# Patient Record
Sex: FEMALE | Race: WHITE | NOT HISPANIC OR LATINO
[De-identification: names, ages, dates, MRNs, and addresses within clinical notes are randomized per-mention and may not be internally consistent; named-entity substitution may affect disease eponyms.]

---

## 2017-11-10 PROBLEM — Z00.00 ENCOUNTER FOR PREVENTIVE HEALTH EXAMINATION: Status: ACTIVE | Noted: 2017-11-10

## 2017-11-30 ENCOUNTER — APPOINTMENT (OUTPATIENT)
Dept: OBGYN | Facility: CLINIC | Age: 32
End: 2017-11-30
Payer: COMMERCIAL

## 2017-11-30 VITALS
WEIGHT: 137 LBS | SYSTOLIC BLOOD PRESSURE: 120 MMHG | BODY MASS INDEX: 19.61 KG/M2 | DIASTOLIC BLOOD PRESSURE: 80 MMHG | HEIGHT: 70 IN

## 2017-11-30 DIAGNOSIS — Z78.9 OTHER SPECIFIED HEALTH STATUS: ICD-10-CM

## 2017-11-30 DIAGNOSIS — Z87.891 PERSONAL HISTORY OF NICOTINE DEPENDENCE: ICD-10-CM

## 2017-11-30 DIAGNOSIS — Z83.3 FAMILY HISTORY OF DIABETES MELLITUS: ICD-10-CM

## 2017-11-30 PROCEDURE — 99204 OFFICE O/P NEW MOD 45 MIN: CPT

## 2017-11-30 RX ORDER — DOXYCYCLINE HYCLATE 100 MG/1
100 TABLET ORAL
Qty: 3 | Refills: 0 | Status: ACTIVE | COMMUNITY
Start: 2017-11-30 | End: 1900-01-01

## 2021-10-21 ENCOUNTER — APPOINTMENT (OUTPATIENT)
Dept: OBGYN | Facility: CLINIC | Age: 36
End: 2021-10-21
Payer: COMMERCIAL

## 2021-10-21 ENCOUNTER — NON-APPOINTMENT (OUTPATIENT)
Age: 36
End: 2021-10-21

## 2021-10-21 VITALS
DIASTOLIC BLOOD PRESSURE: 60 MMHG | WEIGHT: 145 LBS | SYSTOLIC BLOOD PRESSURE: 100 MMHG | HEIGHT: 70 IN | OXYGEN SATURATION: 98 % | BODY MASS INDEX: 20.76 KG/M2

## 2021-10-21 DIAGNOSIS — O09.819 SUPERVISION OF PREGNANCY RESULTING FROM ASSISTED REPRODUCTIVE TECHNOLOGY, UNSPECIFIED TRIMESTER: ICD-10-CM

## 2021-10-21 DIAGNOSIS — G43.909 MIGRAINE, UNSPECIFIED, NOT INTRACTABLE, W/OUT STATUS MIGRAINOSUS: ICD-10-CM

## 2021-10-21 PROCEDURE — 0500F INITIAL PRENATAL CARE VISIT: CPT

## 2021-10-21 PROCEDURE — 81002 URINALYSIS NONAUTO W/O SCOPE: CPT

## 2021-10-21 RX ORDER — ASPIRIN 81 MG/1
81 TABLET, COATED ORAL
Refills: 0 | Status: ACTIVE | COMMUNITY

## 2021-10-21 RX ORDER — ENOXAPARIN SODIUM 40 MG/.4ML
40 INJECTION SUBCUTANEOUS
Refills: 0 | Status: ACTIVE | COMMUNITY

## 2021-10-25 ENCOUNTER — LABORATORY RESULT (OUTPATIENT)
Age: 36
End: 2021-10-25

## 2021-10-26 ENCOUNTER — APPOINTMENT (OUTPATIENT)
Dept: MATERNAL FETAL MEDICINE | Facility: CLINIC | Age: 36
End: 2021-10-26
Payer: COMMERCIAL

## 2021-10-26 ENCOUNTER — NON-APPOINTMENT (OUTPATIENT)
Age: 36
End: 2021-10-26

## 2021-10-26 VITALS
HEIGHT: 70 IN | BODY MASS INDEX: 20.9 KG/M2 | DIASTOLIC BLOOD PRESSURE: 70 MMHG | SYSTOLIC BLOOD PRESSURE: 108 MMHG | WEIGHT: 146 LBS

## 2021-10-26 PROCEDURE — 81003 URINALYSIS AUTO W/O SCOPE: CPT | Mod: QW

## 2021-10-26 PROCEDURE — 99205 OFFICE O/P NEW HI 60 MIN: CPT

## 2021-10-26 NOTE — DISCUSSION/SUMMARY
[FreeTextEntry1] : 1. Recurrent pregnancy loss\par RPL is classically defined as the occurrence of three or more consecutive losses of clinically recognized pregnancies prior to the 20th week of gestation. Clinical pregnancies are confirmed by ultrasound or histopathologic evaluation; chemical pregnancies are not included. There is a general consensus that healthy women should not undergo extensive evaluation after a single first trimester or early second trimester spontaneous miscarriage (up to 20 weeks), given these are relatively common, sporadic events. \par \par In prospective studies, the overall risk of miscarriage in the next pregnancy remains about 15% after one miscarriage, but rises to 17 to 31% after two consecutive miscarriages and to 25 to 46% after three or more miscarriages. Based on these and similar data, most experts initiate evaluation and treatment of RPL after two consecutive miscarriages.  Most women with RPL have a good prognosis for eventually having a successful pregnancy, even when a definitive diagnosis is not made and no treatment initiated (reported rates of subsequent successful pregnancy outcome have ranged from 47 to 77%).  Although RPL is an important problem in women's health, there are many unsolved questions regarding etiology, evaluation, and management. Unfortunately, the cause of RPL can be determined in only 50% of patients. General etiological categories of RPL include anatomic, immunological, genetic, endocrine, infectious, thrombophilic, and environmental factors.\par \par Previous testing for inherited thrombophilias and antiphospholipid syndrome all negative. This pregnancy is IVF with preimplantation genetic testing (no need for parental karyotype to rule out a balanced translocation). Patient also reports many evaluations of the uterine cavity were normal prepregnancy. We reviewed the controversial nature of anticoagulation use in this setting and discussed the lack of a clear indication for its use. Regardless, in this well counseled patient who prefers to continue a prophylactic dose of Lovenox in pregnancy I am comfortable. Risks reviewed. \par \par 2. Advanced maternal age\par Pregnancy complications that occur with increased frequency in older gravidae include: ectopic pregnancy, spontaneous , fetal chromosomal abnormalities, some congenital anomalies, placenta previa, gestational diabetes, preeclampsia, and  delivery. Such complications may, in turn, result in  birth. There is also an increased risk of  mortality. We discussed strategies to monitor/screen for these adverse outcomes and the possible need for increased maternal and fetal surveillance during pregnancy. \par \par Discussed increased risk of aneuploidy with advancing maternal age. Discussed screening options for detection of aneuploidy (including cffDNA), and limitations of screening tests. Also discussed diagnostic tests available including CVS or amniocentesis. Risks, benefits and alternatives of tests discussed. \par \par \par Recommendations:\par 1. Will co-manage prenatal care with midwifery practice\par 2. Obtain prenatal records from this pregnancy and MFM consult for my review, will hold off on sending additional testing at this time\par 3. Early anatomy sonogram ~16 weeks and routine anatomy 20-21 weeks\par 4. Due to poor obstetric history recommended serial evaluation of fetal growth ~q4 weeks after anatomy sonogram. Will likely also initiate increased fetal testing ~32 to 36 weeks. \par 5. Discussed based on history will likely recommend delivery 39-40 weeks\par

## 2021-10-26 NOTE — HISTORY OF PRESENT ILLNESS
[FreeTextEntry1] : Maternal-Fetal Medicine consultation \par Prenatal care: Heidy Valdez CNM\par \par Chief compliant: Pregnant with history of recurrent pregnancy loss\par \par REINIER MOSLEY is a 36 year  at 15w5d (JP 22 by IVF per patient report) that presents for a MFM consultation. She has been receiving prenatal care at an outside OB group. Reports prenatal care has been uncomplicated. Also, reports seeing MFM at [facility] and no new recs made. \par \par Obstetric history:\par ETOP x 2 (medical x 1, surgical x 1)\par Early loss x 5, all < 10 weeks, 2 miscarriages genetically tested and normal per patient \par (3 total D&Cs); IVF with different providers including [facility], most recently Dr. Flores at [facility]\par \par Medical history notable for migraines since childhood, ~1x/month. Followed by neurologist, Dr. Poppy Ferrell. Magnesium daily, and in cases migraine using prednisone 20mg x 1 and Rizatriptan. Gynecologic history is unremarkable. Current medications include prenatal vitamins, Lovenox 40 mg daily, aspirin 81 mg daily. Allergies: NKDA \par \par She works as a . She lives with her . She denies a history of tobacco use. She denies alcohol or drug use in this pregnancy.  She has never received blood products but is willing to receive them if needed. \par \par Family history is notable for DM in her father. Mother has arthritis, psoriasis, rheumatologic d/o. Brother is healthy. She denies a family history of birth defects, mental retardation, developmental delay or genetic disorders. Denies history of poor obstetric history.\par \par Height: 5'10" Prepregnancy weight: 144# \par Prepregnancy BMI: 20.7

## 2021-10-26 NOTE — DATA REVIEWED
[FreeTextEntry1] : MFM consult 2015 reviewed, scanned in this chart\par \par Bedside sonogram: Fetal biometry correlates with dates. Normal FHR. Low-lying placenta on abdominal views.

## 2021-10-27 ENCOUNTER — NON-APPOINTMENT (OUTPATIENT)
Age: 36
End: 2021-10-27

## 2021-10-28 ENCOUNTER — NON-APPOINTMENT (OUTPATIENT)
Age: 36
End: 2021-10-28

## 2021-10-28 ENCOUNTER — APPOINTMENT (OUTPATIENT)
Dept: OBGYN | Facility: CLINIC | Age: 36
End: 2021-10-28
Payer: COMMERCIAL

## 2021-10-28 VITALS
DIASTOLIC BLOOD PRESSURE: 70 MMHG | WEIGHT: 147 LBS | OXYGEN SATURATION: 99 % | BODY MASS INDEX: 21.09 KG/M2 | SYSTOLIC BLOOD PRESSURE: 100 MMHG

## 2021-10-28 DIAGNOSIS — Z67.11 TYPE A BLOOD, RH NEGATIVE: ICD-10-CM

## 2021-10-28 PROCEDURE — 81002 URINALYSIS NONAUTO W/O SCOPE: CPT

## 2021-10-28 PROCEDURE — 0502F SUBSEQUENT PRENATAL CARE: CPT

## 2021-11-03 ENCOUNTER — TRANSCRIPTION ENCOUNTER (OUTPATIENT)
Age: 36
End: 2021-11-03

## 2021-11-03 ENCOUNTER — APPOINTMENT (OUTPATIENT)
Dept: ANTEPARTUM | Facility: CLINIC | Age: 36
End: 2021-11-03
Payer: COMMERCIAL

## 2021-11-03 ENCOUNTER — ASOB RESULT (OUTPATIENT)
Age: 36
End: 2021-11-03

## 2021-11-03 PROCEDURE — 76817 TRANSVAGINAL US OBSTETRIC: CPT

## 2021-11-03 PROCEDURE — 76811 OB US DETAILED SNGL FETUS: CPT

## 2021-11-04 ENCOUNTER — NON-APPOINTMENT (OUTPATIENT)
Age: 36
End: 2021-11-04

## 2021-11-11 ENCOUNTER — LABORATORY RESULT (OUTPATIENT)
Age: 36
End: 2021-11-11

## 2021-11-11 ENCOUNTER — APPOINTMENT (OUTPATIENT)
Dept: OBGYN | Facility: CLINIC | Age: 36
End: 2021-11-11
Payer: COMMERCIAL

## 2021-11-11 ENCOUNTER — NON-APPOINTMENT (OUTPATIENT)
Age: 36
End: 2021-11-11

## 2021-11-11 VITALS
DIASTOLIC BLOOD PRESSURE: 70 MMHG | SYSTOLIC BLOOD PRESSURE: 120 MMHG | BODY MASS INDEX: 21.09 KG/M2 | WEIGHT: 147 LBS | OXYGEN SATURATION: 98 %

## 2021-11-11 PROCEDURE — 0502F SUBSEQUENT PRENATAL CARE: CPT

## 2021-11-11 PROCEDURE — 36415 COLL VENOUS BLD VENIPUNCTURE: CPT

## 2021-11-13 LAB
25(OH)D3 SERPL-MCNC: 40.1 NG/ML
ALBUMIN SERPL ELPH-MCNC: 4.4 G/DL
ALP BLD-CCNC: 146 U/L
ALT SERPL-CCNC: 23 U/L
ANION GAP SERPL CALC-SCNC: 21 MMOL/L
AST SERPL-CCNC: 20 U/L
BILIRUB SERPL-MCNC: <0.2 MG/DL
BUN SERPL-MCNC: 17 MG/DL
CALCIUM SERPL-MCNC: 10.2 MG/DL
CHLORIDE SERPL-SCNC: 102 MMOL/L
CO2 SERPL-SCNC: 18 MMOL/L
CREAT SERPL-MCNC: 0.55 MG/DL
GLUCOSE SERPL-MCNC: 69 MG/DL
POTASSIUM SERPL-SCNC: 4.8 MMOL/L
PROT SERPL-MCNC: 6.7 G/DL
SODIUM SERPL-SCNC: 141 MMOL/L
TSH SERPL-ACNC: 1.18 UIU/ML

## 2021-11-15 ENCOUNTER — TRANSCRIPTION ENCOUNTER (OUTPATIENT)
Age: 36
End: 2021-11-15

## 2021-11-17 ENCOUNTER — ASOB RESULT (OUTPATIENT)
Age: 36
End: 2021-11-17

## 2021-11-17 ENCOUNTER — APPOINTMENT (OUTPATIENT)
Dept: ANTEPARTUM | Facility: CLINIC | Age: 36
End: 2021-11-17
Payer: COMMERCIAL

## 2021-11-17 PROCEDURE — 76816 OB US FOLLOW-UP PER FETUS: CPT

## 2021-11-24 ENCOUNTER — NON-APPOINTMENT (OUTPATIENT)
Age: 36
End: 2021-11-24

## 2021-11-27 ENCOUNTER — TRANSCRIPTION ENCOUNTER (OUTPATIENT)
Age: 36
End: 2021-11-27

## 2021-11-29 ENCOUNTER — NON-APPOINTMENT (OUTPATIENT)
Age: 36
End: 2021-11-29

## 2021-12-02 ENCOUNTER — APPOINTMENT (OUTPATIENT)
Dept: OBGYN | Facility: CLINIC | Age: 36
End: 2021-12-02
Payer: COMMERCIAL

## 2021-12-02 VITALS — BODY MASS INDEX: 21.67 KG/M2 | DIASTOLIC BLOOD PRESSURE: 80 MMHG | SYSTOLIC BLOOD PRESSURE: 110 MMHG | WEIGHT: 151 LBS

## 2021-12-02 PROCEDURE — 0502F SUBSEQUENT PRENATAL CARE: CPT

## 2021-12-02 PROCEDURE — 81002 URINALYSIS NONAUTO W/O SCOPE: CPT

## 2021-12-08 ENCOUNTER — APPOINTMENT (OUTPATIENT)
Dept: ANTEPARTUM | Facility: CLINIC | Age: 36
End: 2021-12-08
Payer: COMMERCIAL

## 2021-12-08 ENCOUNTER — ASOB RESULT (OUTPATIENT)
Age: 36
End: 2021-12-08

## 2021-12-08 PROCEDURE — 76816 OB US FOLLOW-UP PER FETUS: CPT

## 2021-12-09 ENCOUNTER — NON-APPOINTMENT (OUTPATIENT)
Age: 36
End: 2021-12-09

## 2021-12-09 ENCOUNTER — TRANSCRIPTION ENCOUNTER (OUTPATIENT)
Age: 36
End: 2021-12-09

## 2021-12-14 ENCOUNTER — TRANSCRIPTION ENCOUNTER (OUTPATIENT)
Age: 36
End: 2021-12-14

## 2021-12-14 ENCOUNTER — APPOINTMENT (OUTPATIENT)
Dept: MATERNAL FETAL MEDICINE | Facility: CLINIC | Age: 36
End: 2021-12-14
Payer: COMMERCIAL

## 2021-12-14 VITALS
SYSTOLIC BLOOD PRESSURE: 113 MMHG | BODY MASS INDEX: 21.65 KG/M2 | WEIGHT: 151.25 LBS | HEIGHT: 70 IN | DIASTOLIC BLOOD PRESSURE: 60 MMHG

## 2021-12-14 PROCEDURE — 99215 OFFICE O/P EST HI 40 MIN: CPT

## 2021-12-15 ENCOUNTER — APPOINTMENT (OUTPATIENT)
Dept: ANTEPARTUM | Facility: CLINIC | Age: 36
End: 2021-12-15
Payer: COMMERCIAL

## 2021-12-15 ENCOUNTER — ASOB RESULT (OUTPATIENT)
Age: 36
End: 2021-12-15

## 2021-12-15 PROCEDURE — 76946 ECHO GUIDE FOR AMNIOCENTESIS: CPT

## 2021-12-15 PROCEDURE — 59000 AMNIOCENTESIS DIAGNOSTIC: CPT

## 2021-12-15 PROCEDURE — 90384 RH IG FULL-DOSE IM: CPT

## 2021-12-16 ENCOUNTER — APPOINTMENT (OUTPATIENT)
Dept: OBGYN | Facility: CLINIC | Age: 36
End: 2021-12-16

## 2021-12-16 ENCOUNTER — NON-APPOINTMENT (OUTPATIENT)
Age: 36
End: 2021-12-16

## 2021-12-17 ENCOUNTER — NON-APPOINTMENT (OUTPATIENT)
Age: 36
End: 2021-12-17

## 2021-12-17 ENCOUNTER — TRANSCRIPTION ENCOUNTER (OUTPATIENT)
Age: 36
End: 2021-12-17

## 2021-12-20 ENCOUNTER — TRANSCRIPTION ENCOUNTER (OUTPATIENT)
Age: 36
End: 2021-12-20

## 2021-12-21 ENCOUNTER — TRANSCRIPTION ENCOUNTER (OUTPATIENT)
Age: 36
End: 2021-12-21

## 2021-12-22 ENCOUNTER — TRANSCRIPTION ENCOUNTER (OUTPATIENT)
Age: 36
End: 2021-12-22

## 2021-12-22 ENCOUNTER — APPOINTMENT (OUTPATIENT)
Dept: ANTEPARTUM | Facility: CLINIC | Age: 36
End: 2021-12-22
Payer: COMMERCIAL

## 2021-12-22 ENCOUNTER — ASOB RESULT (OUTPATIENT)
Age: 36
End: 2021-12-22

## 2021-12-22 VITALS — DIASTOLIC BLOOD PRESSURE: 60 MMHG | SYSTOLIC BLOOD PRESSURE: 113 MMHG

## 2021-12-22 PROCEDURE — 76816 OB US FOLLOW-UP PER FETUS: CPT

## 2021-12-22 RX ORDER — VITAMIN A, ASCORBIC ACID, VITAMIN D, .ALPHA.-TOCOPHEROL, THIAMINE MONONITRATE, RIBOFLAVIN, NIACIN, PYRIDOXINE HYDROCHLORIDE, FOLIC ACID, CYANOCOBALAMIN, CALCIUM, IRON, MAGNESIUM, ZINC, AND COPPER 2700; 70; 400; 30; 1.6; 1.8; 18; 2.5; 1; 12; 100; 65; 25; 25; 2 [IU]/1; MG/1; [IU]/1; [IU]/1; MG/1; MG/1; MG/1; MG/1; MG/1; UG/1; MG/1; MG/1; MG/1; MG/1; MG/1
TABLET ORAL DAILY
Qty: 90 | Refills: 1 | Status: ACTIVE | COMMUNITY
Start: 2021-12-22 | End: 1900-01-01

## 2021-12-22 NOTE — DISCUSSION/SUMMARY
[FreeTextEntry1] : Early onset fetal growth restriction\par \par Patient and her partner were both present for today's discussion. The below was reviewed in detail. \par \par Risks and benefits of amniocentesis reviewed. Limitations of IVF with PGS reviewed. We also discussed the increased risk of underlying fetal genetic anomalies in the setting of early onset FGR. We discussed the significantly increased risk of stillbirth, adverse  outcomes,  death and indicated  delivery with these findings. We also discussed the increased risk of other adverse outcomes such as cerebal palsy, even with optimal care, and reviewed other risks related to extreme prematurity.\par \par Option of pregnancy termination reviewed. Limitations in [place] state reviewed. Patient and partner declined this option for now and opted to proceed with amniocentesis with repeat evaluation of fetal growth at 23w6d.  \par \par Signs and symptoms of preeclampsia reviewed\par \par Continue to follow with me ~2 weeks\par

## 2021-12-22 NOTE — HISTORY OF PRESENT ILLNESS
[FreeTextEntry1] : Maternal-Fetal Medicine consultation \par Prenatal care: Heidy Valdez CNM\par \par Chief compliant: Early onset fetal growth restriction\par \par REINIER MOSLEY is a 36 year  at 22w5d (JP 22 by date of conception/IVF) that presents for a follow-up MFM consultation due to the poor obstetric history and concern for 2nd trimester/early onset fetal growth restriction. \par \par AP issues:\par 1. IVF pregnancy\par 2. Advanced maternal age\par 3. Poor obstetric history: Early loss x 5, all < 10 weeks, 2 miscarriages genetically tested and normal per patient \par (3 total D&Cs). Inherited and acquired thrombophilia panels negative/normal. Regardless, patient has opted to continue LMWH in the pregnancy -- risks and benefits discussed. \par 4. Migraines since childhood, ~1x/month. Followed by neurologist, Dr. Poppy Ferrell. Magnesium daily, and in cases migraine using prednisone 20mg x 1 and Rizatriptan. \par 5. Suspected early onset fetal growth restriction:\par 11/3/21 16w6d, fetal growth lagging by 6 days\par 21 18w6d, growth lagging by 9 days\par 21 21w6d, growth lagging by 13 days, otherwise normal anatomy sonogram. Placenta previa noted. \par \par Current medications include prenatal vitamins, Lovenox 40 mg daily, aspirin 81 mg daily. Allergies: NKDA \par \par Height: 5'10" Prepregnancy weight: 144# \par Prepregnancy BMI: 20.7

## 2021-12-24 ENCOUNTER — TRANSCRIPTION ENCOUNTER (OUTPATIENT)
Age: 36
End: 2021-12-24

## 2021-12-28 ENCOUNTER — TRANSCRIPTION ENCOUNTER (OUTPATIENT)
Age: 36
End: 2021-12-28

## 2021-12-28 DIAGNOSIS — O36.5990 MATERNAL CARE FOR OTHER KNOWN OR SUSPECTED POOR FETAL GROWTH, UNSPECIFIED TRIMESTER, NOT APPLICABLE OR UNSPECIFIED: ICD-10-CM

## 2022-01-03 ENCOUNTER — APPOINTMENT (OUTPATIENT)
Dept: PEDIATRIC CARDIOLOGY | Facility: CLINIC | Age: 37
End: 2022-01-03

## 2022-01-03 PROCEDURE — 76820 UMBILICAL ARTERY ECHO: CPT

## 2022-01-03 PROCEDURE — 99203 OFFICE O/P NEW LOW 30 MIN: CPT

## 2022-01-03 PROCEDURE — 76821 MIDDLE CEREBRAL ARTERY ECHO: CPT

## 2022-01-03 PROCEDURE — 93325 DOPPLER ECHO COLOR FLOW MAPG: CPT | Mod: 1L,59

## 2022-01-03 PROCEDURE — 76827 ECHO EXAM OF FETAL HEART: CPT

## 2022-01-03 PROCEDURE — 76825 ECHO EXAM OF FETAL HEART: CPT

## 2022-01-12 ENCOUNTER — APPOINTMENT (OUTPATIENT)
Dept: ANTEPARTUM | Facility: CLINIC | Age: 37
End: 2022-01-12
Payer: COMMERCIAL

## 2022-01-12 ENCOUNTER — ASOB RESULT (OUTPATIENT)
Age: 37
End: 2022-01-12

## 2022-01-12 PROCEDURE — 76816 OB US FOLLOW-UP PER FETUS: CPT

## 2022-01-12 PROCEDURE — 76819 FETAL BIOPHYS PROFIL W/O NST: CPT

## 2022-01-14 ENCOUNTER — TRANSCRIPTION ENCOUNTER (OUTPATIENT)
Age: 37
End: 2022-01-14

## 2022-01-14 ENCOUNTER — NON-APPOINTMENT (OUTPATIENT)
Age: 37
End: 2022-01-14

## 2022-01-20 ENCOUNTER — TRANSCRIPTION ENCOUNTER (OUTPATIENT)
Age: 37
End: 2022-01-20

## 2022-01-20 ENCOUNTER — NON-APPOINTMENT (OUTPATIENT)
Age: 37
End: 2022-01-20

## 2022-01-21 ENCOUNTER — TRANSCRIPTION ENCOUNTER (OUTPATIENT)
Age: 37
End: 2022-01-21

## 2022-01-21 DIAGNOSIS — Z34.92 ENCOUNTER FOR SUPERVISION OF NORMAL PREGNANCY, UNSPECIFIED, SECOND TRIMESTER: ICD-10-CM

## 2022-01-21 RX ORDER — HUMAN RHO(D) IMMUNE GLOBULIN 300 UG/1
1500 INJECTION, SOLUTION INTRAMUSCULAR
Qty: 1 | Refills: 0 | Status: ACTIVE | COMMUNITY
Start: 2022-01-21 | End: 1900-01-01

## 2022-01-24 RX ORDER — ENOXAPARIN SODIUM 100 MG/ML
40 INJECTION SUBCUTANEOUS
Qty: 30 | Refills: 1 | Status: ACTIVE | COMMUNITY
Start: 2022-01-14 | End: 1900-01-01

## 2022-01-25 ENCOUNTER — TRANSCRIPTION ENCOUNTER (OUTPATIENT)
Age: 37
End: 2022-01-25

## 2022-01-26 ENCOUNTER — TRANSCRIPTION ENCOUNTER (OUTPATIENT)
Age: 37
End: 2022-01-26

## 2022-01-27 ENCOUNTER — TRANSCRIPTION ENCOUNTER (OUTPATIENT)
Age: 37
End: 2022-01-27

## 2022-01-28 ENCOUNTER — APPOINTMENT (OUTPATIENT)
Dept: ANTEPARTUM | Facility: CLINIC | Age: 37
End: 2022-01-28
Payer: COMMERCIAL

## 2022-01-28 ENCOUNTER — TRANSCRIPTION ENCOUNTER (OUTPATIENT)
Age: 37
End: 2022-01-28

## 2022-01-28 ENCOUNTER — ASOB RESULT (OUTPATIENT)
Age: 37
End: 2022-01-28

## 2022-01-28 PROCEDURE — 76816 OB US FOLLOW-UP PER FETUS: CPT

## 2022-01-28 PROCEDURE — 76818 FETAL BIOPHYS PROFILE W/NST: CPT

## 2022-01-31 ENCOUNTER — TRANSCRIPTION ENCOUNTER (OUTPATIENT)
Age: 37
End: 2022-01-31

## 2022-02-01 ENCOUNTER — APPOINTMENT (OUTPATIENT)
Dept: ANTEPARTUM | Facility: CLINIC | Age: 37
End: 2022-02-01
Payer: COMMERCIAL

## 2022-02-01 ENCOUNTER — TRANSCRIPTION ENCOUNTER (OUTPATIENT)
Age: 37
End: 2022-02-01

## 2022-02-01 ENCOUNTER — ASOB RESULT (OUTPATIENT)
Age: 37
End: 2022-02-01

## 2022-02-01 PROCEDURE — 76818 FETAL BIOPHYS PROFILE W/NST: CPT

## 2022-02-01 PROCEDURE — 76816 OB US FOLLOW-UP PER FETUS: CPT

## 2022-02-02 ENCOUNTER — NON-APPOINTMENT (OUTPATIENT)
Age: 37
End: 2022-02-02

## 2022-02-02 ENCOUNTER — TRANSCRIPTION ENCOUNTER (OUTPATIENT)
Age: 37
End: 2022-02-02

## 2022-02-02 RX ORDER — HUMAN RHO(D) IMMUNE GLOBULIN 300 UG/1
1500 INJECTION, SOLUTION INTRAMUSCULAR
Refills: 0 | Status: ACTIVE | COMMUNITY

## 2022-02-03 ENCOUNTER — TRANSCRIPTION ENCOUNTER (OUTPATIENT)
Age: 37
End: 2022-02-03

## 2022-02-04 ENCOUNTER — APPOINTMENT (OUTPATIENT)
Dept: ANTEPARTUM | Facility: CLINIC | Age: 37
End: 2022-02-04
Payer: COMMERCIAL

## 2022-02-04 ENCOUNTER — ASOB RESULT (OUTPATIENT)
Age: 37
End: 2022-02-04

## 2022-02-04 ENCOUNTER — TRANSCRIPTION ENCOUNTER (OUTPATIENT)
Age: 37
End: 2022-02-04

## 2022-02-04 ENCOUNTER — NON-APPOINTMENT (OUTPATIENT)
Age: 37
End: 2022-02-04

## 2022-02-04 PROCEDURE — 76818 FETAL BIOPHYS PROFILE W/NST: CPT

## 2022-02-04 PROCEDURE — 76816 OB US FOLLOW-UP PER FETUS: CPT

## 2022-02-05 ENCOUNTER — TRANSCRIPTION ENCOUNTER (OUTPATIENT)
Age: 37
End: 2022-02-05

## 2022-02-08 ENCOUNTER — NON-APPOINTMENT (OUTPATIENT)
Age: 37
End: 2022-02-08

## 2022-02-08 ENCOUNTER — ASOB RESULT (OUTPATIENT)
Age: 37
End: 2022-02-08

## 2022-02-08 ENCOUNTER — APPOINTMENT (OUTPATIENT)
Dept: ANTEPARTUM | Facility: CLINIC | Age: 37
End: 2022-02-08
Payer: COMMERCIAL

## 2022-02-08 PROCEDURE — 76816 OB US FOLLOW-UP PER FETUS: CPT

## 2022-02-08 PROCEDURE — 76818 FETAL BIOPHYS PROFILE W/NST: CPT

## 2022-02-10 ENCOUNTER — TRANSCRIPTION ENCOUNTER (OUTPATIENT)
Age: 37
End: 2022-02-10

## 2022-02-11 ENCOUNTER — TRANSCRIPTION ENCOUNTER (OUTPATIENT)
Age: 37
End: 2022-02-11

## 2022-02-11 ENCOUNTER — NON-APPOINTMENT (OUTPATIENT)
Age: 37
End: 2022-02-11

## 2022-02-11 ENCOUNTER — APPOINTMENT (OUTPATIENT)
Dept: ANTEPARTUM | Facility: CLINIC | Age: 37
End: 2022-02-11
Payer: COMMERCIAL

## 2022-02-11 ENCOUNTER — ASOB RESULT (OUTPATIENT)
Age: 37
End: 2022-02-11

## 2022-02-11 DIAGNOSIS — Z34.93 ENCOUNTER FOR SUPERVISION OF NORMAL PREGNANCY, UNSPECIFIED, THIRD TRIMESTER: ICD-10-CM

## 2022-02-11 PROCEDURE — 76819 FETAL BIOPHYS PROFIL W/O NST: CPT

## 2022-02-14 ENCOUNTER — NON-APPOINTMENT (OUTPATIENT)
Age: 37
End: 2022-02-14

## 2022-02-14 ENCOUNTER — TRANSCRIPTION ENCOUNTER (OUTPATIENT)
Age: 37
End: 2022-02-14

## 2022-02-14 LAB
25(OH)D3 SERPL-MCNC: 27.7 NG/ML
ALBUMIN SERPL ELPH-MCNC: 4 G/DL
ALP BLD-CCNC: 338 U/L
ALT SERPL-CCNC: 31 U/L
ANION GAP SERPL CALC-SCNC: 14 MMOL/L
AST SERPL-CCNC: 31 U/L
BASOPHILS # BLD AUTO: 0.02 K/UL
BASOPHILS NFR BLD AUTO: 0.2 %
BILE AC SER-MCNC: 5.8 UMOL/L
BILIRUB SERPL-MCNC: <0.2 MG/DL
BUN SERPL-MCNC: 11 MG/DL
CALCIT SERPL-MCNC: <1 PG/ML
CALCIUM SERPL-MCNC: 9.9 MG/DL
CHLORIDE SERPL-SCNC: 105 MMOL/L
CO2 SERPL-SCNC: 20 MMOL/L
CREAT SERPL-MCNC: 0.56 MG/DL
EOSINOPHIL # BLD AUTO: 0.1 K/UL
EOSINOPHIL NFR BLD AUTO: 0.9 %
FERRITIN SERPL-MCNC: 30 NG/ML
FIBRINOGEN PPP COAG.DERIVED-MCNC: 734 MG/DL
GLUCOSE SERPL-MCNC: 89 MG/DL
HCT VFR BLD CALC: 32.6 %
HGB BLD-MCNC: 10.7 G/DL
IMM GRANULOCYTES NFR BLD AUTO: 0.4 %
INR PPP: 0.98 RATIO
IRON SATN MFR SERPL: 23 %
IRON SERPL-MCNC: 86 UG/DL
LYMPHOCYTES # BLD AUTO: 1.54 K/UL
LYMPHOCYTES NFR BLD AUTO: 14.3 %
MAN DIFF?: NORMAL
MCHC RBC-ENTMCNC: 30.8 PG
MCHC RBC-ENTMCNC: 32.8 GM/DL
MCV RBC AUTO: 93.9 FL
MONOCYTES # BLD AUTO: 0.83 K/UL
MONOCYTES NFR BLD AUTO: 7.7 %
NEUTROPHILS # BLD AUTO: 8.23 K/UL
NEUTROPHILS NFR BLD AUTO: 76.5 %
PLATELET # BLD AUTO: 264 K/UL
POTASSIUM SERPL-SCNC: 4.3 MMOL/L
PROT SERPL-MCNC: 6.2 G/DL
PT BLD: 11.7 SEC
RBC # BLD: 3.47 M/UL
RBC # FLD: 13.2 %
SODIUM SERPL-SCNC: 138 MMOL/L
TIBC SERPL-MCNC: 379 UG/DL
UIBC SERPL-MCNC: 293 UG/DL
URATE SERPL-MCNC: 4.6 MG/DL
WBC # FLD AUTO: 10.76 K/UL

## 2022-02-15 ENCOUNTER — ASOB RESULT (OUTPATIENT)
Age: 37
End: 2022-02-15

## 2022-02-15 ENCOUNTER — APPOINTMENT (OUTPATIENT)
Dept: ANTEPARTUM | Facility: CLINIC | Age: 37
End: 2022-02-15
Payer: COMMERCIAL

## 2022-02-15 ENCOUNTER — TRANSCRIPTION ENCOUNTER (OUTPATIENT)
Age: 37
End: 2022-02-15

## 2022-02-15 PROCEDURE — 76818 FETAL BIOPHYS PROFILE W/NST: CPT

## 2022-02-16 ENCOUNTER — TRANSCRIPTION ENCOUNTER (OUTPATIENT)
Age: 37
End: 2022-02-16

## 2022-02-17 ENCOUNTER — NON-APPOINTMENT (OUTPATIENT)
Age: 37
End: 2022-02-17

## 2022-02-18 ENCOUNTER — ASOB RESULT (OUTPATIENT)
Age: 37
End: 2022-02-18

## 2022-02-18 ENCOUNTER — APPOINTMENT (OUTPATIENT)
Dept: OBGYN | Facility: CLINIC | Age: 37
End: 2022-02-18
Payer: COMMERCIAL

## 2022-02-18 ENCOUNTER — APPOINTMENT (OUTPATIENT)
Dept: ANTEPARTUM | Facility: CLINIC | Age: 37
End: 2022-02-18
Payer: COMMERCIAL

## 2022-02-18 VITALS
WEIGHT: 161 LBS | OXYGEN SATURATION: 98 % | SYSTOLIC BLOOD PRESSURE: 100 MMHG | BODY MASS INDEX: 23.1 KG/M2 | DIASTOLIC BLOOD PRESSURE: 60 MMHG

## 2022-02-18 DIAGNOSIS — O99.013 ANEMIA COMPLICATING PREGNANCY, THIRD TRIMESTER: ICD-10-CM

## 2022-02-18 PROCEDURE — 81002 URINALYSIS NONAUTO W/O SCOPE: CPT

## 2022-02-18 PROCEDURE — 76816 OB US FOLLOW-UP PER FETUS: CPT

## 2022-02-18 PROCEDURE — 0502F SUBSEQUENT PRENATAL CARE: CPT

## 2022-02-22 ENCOUNTER — ASOB RESULT (OUTPATIENT)
Age: 37
End: 2022-02-22

## 2022-02-22 ENCOUNTER — APPOINTMENT (OUTPATIENT)
Dept: ANTEPARTUM | Facility: CLINIC | Age: 37
End: 2022-02-22
Payer: COMMERCIAL

## 2022-02-22 PROCEDURE — 76818 FETAL BIOPHYS PROFILE W/NST: CPT

## 2022-02-22 PROCEDURE — 76816 OB US FOLLOW-UP PER FETUS: CPT

## 2022-02-24 ENCOUNTER — TRANSCRIPTION ENCOUNTER (OUTPATIENT)
Age: 37
End: 2022-02-24

## 2022-02-25 ENCOUNTER — ASOB RESULT (OUTPATIENT)
Age: 37
End: 2022-02-25

## 2022-02-25 ENCOUNTER — APPOINTMENT (OUTPATIENT)
Dept: ANTEPARTUM | Facility: CLINIC | Age: 37
End: 2022-02-25
Payer: COMMERCIAL

## 2022-02-25 ENCOUNTER — LABORATORY RESULT (OUTPATIENT)
Age: 37
End: 2022-02-25

## 2022-02-25 PROBLEM — O99.013 ANTEPARTUM ANEMIA, THIRD TRIMESTER: Status: ACTIVE | Noted: 2022-02-25

## 2022-02-25 PROCEDURE — 76819 FETAL BIOPHYS PROFIL W/O NST: CPT

## 2022-02-25 PROCEDURE — 76816 OB US FOLLOW-UP PER FETUS: CPT

## 2022-02-28 LAB
ALBUMIN SERPL ELPH-MCNC: 4 G/DL
ALP BLD-CCNC: 419 U/L
ALT SERPL-CCNC: 28 U/L
ANION GAP SERPL CALC-SCNC: 13 MMOL/L
AST SERPL-CCNC: 21 U/L
BASOPHILS # BLD AUTO: 0.04 K/UL
BASOPHILS NFR BLD AUTO: 0.4 %
BILIRUB SERPL-MCNC: 0.2 MG/DL
BUN SERPL-MCNC: 9 MG/DL
CALCIUM SERPL-MCNC: 9.7 MG/DL
CHLORIDE SERPL-SCNC: 103 MMOL/L
CO2 SERPL-SCNC: 20 MMOL/L
CREAT SERPL-MCNC: 0.51 MG/DL
EOSINOPHIL # BLD AUTO: 0.15 K/UL
EOSINOPHIL NFR BLD AUTO: 1.4 %
FERRITIN SERPL-MCNC: 29 NG/ML
FIBRINOGEN PPP COAG.DERIVED-MCNC: 784 MG/DL
GLUCOSE SERPL-MCNC: 73 MG/DL
HCT VFR BLD CALC: 35.5 %
HGB BLD-MCNC: 11.4 G/DL
IMM GRANULOCYTES NFR BLD AUTO: 0.6 %
INR PPP: 0.93 RATIO
IRON SATN MFR SERPL: 26 %
IRON SERPL-MCNC: 107 UG/DL
LDH SERPL-CCNC: 172 U/L
LYMPHOCYTES # BLD AUTO: 1.81 K/UL
LYMPHOCYTES NFR BLD AUTO: 16.7 %
MAN DIFF?: NORMAL
MCHC RBC-ENTMCNC: 30.9 PG
MCHC RBC-ENTMCNC: 32.1 GM/DL
MCV RBC AUTO: 96.2 FL
MONOCYTES # BLD AUTO: 0.91 K/UL
MONOCYTES NFR BLD AUTO: 8.4 %
NEUTROPHILS # BLD AUTO: 7.83 K/UL
NEUTROPHILS NFR BLD AUTO: 72.5 %
PLATELET # BLD AUTO: 253 K/UL
POTASSIUM SERPL-SCNC: 4.4 MMOL/L
PROT SERPL-MCNC: 6.4 G/DL
PT BLD: 10.8 SEC
RBC # BLD: 3.69 M/UL
RBC # FLD: 13.1 %
SODIUM SERPL-SCNC: 136 MMOL/L
TIBC SERPL-MCNC: 408 UG/DL
UIBC SERPL-MCNC: 301 UG/DL
URATE SERPL-MCNC: 4.1 MG/DL
WBC # FLD AUTO: 10.81 K/UL

## 2022-03-01 ENCOUNTER — APPOINTMENT (OUTPATIENT)
Dept: ANTEPARTUM | Facility: CLINIC | Age: 37
End: 2022-03-01
Payer: COMMERCIAL

## 2022-03-01 ENCOUNTER — TRANSCRIPTION ENCOUNTER (OUTPATIENT)
Age: 37
End: 2022-03-01

## 2022-03-01 ENCOUNTER — ASOB RESULT (OUTPATIENT)
Age: 37
End: 2022-03-01

## 2022-03-01 ENCOUNTER — NON-APPOINTMENT (OUTPATIENT)
Age: 37
End: 2022-03-01

## 2022-03-01 PROCEDURE — 76818 FETAL BIOPHYS PROFILE W/NST: CPT

## 2022-03-02 ENCOUNTER — TRANSCRIPTION ENCOUNTER (OUTPATIENT)
Age: 37
End: 2022-03-02

## 2022-03-03 LAB
CREAT 24H UR-MCNC: 1.2 G/24 H
CREAT ?TM UR-MCNC: 47 MG/DL
PROT 24H UR-MRATE: 12 MG/DL
PROT ?TM UR-MCNC: 24 HR
PROT UR-MCNC: 318 MG/24 H
SPECIMEN VOL 24H UR: 2650 ML

## 2022-03-04 ENCOUNTER — NON-APPOINTMENT (OUTPATIENT)
Age: 37
End: 2022-03-04

## 2022-03-04 ENCOUNTER — ASOB RESULT (OUTPATIENT)
Age: 37
End: 2022-03-04

## 2022-03-04 ENCOUNTER — APPOINTMENT (OUTPATIENT)
Dept: ANTEPARTUM | Facility: CLINIC | Age: 37
End: 2022-03-04
Payer: COMMERCIAL

## 2022-03-04 ENCOUNTER — APPOINTMENT (OUTPATIENT)
Dept: OBGYN | Facility: CLINIC | Age: 37
End: 2022-03-04

## 2022-03-04 DIAGNOSIS — F41.9 ANXIETY DISORDER, UNSPECIFIED: ICD-10-CM

## 2022-03-04 PROCEDURE — 76819 FETAL BIOPHYS PROFIL W/O NST: CPT

## 2022-03-04 RX ORDER — LORAZEPAM 1 MG/1
1 TABLET ORAL 3 TIMES DAILY
Qty: 10 | Refills: 0 | Status: ACTIVE | COMMUNITY
Start: 2022-03-04 | End: 1900-01-01

## 2022-03-07 ENCOUNTER — TRANSCRIPTION ENCOUNTER (OUTPATIENT)
Age: 37
End: 2022-03-07

## 2022-03-08 ENCOUNTER — TRANSCRIPTION ENCOUNTER (OUTPATIENT)
Age: 37
End: 2022-03-08

## 2022-03-08 LAB
ALBUMIN SERPL ELPH-MCNC: 3.9 G/DL
ALP BLD-CCNC: 430 U/L
ALT SERPL-CCNC: 20 U/L
ANION GAP SERPL CALC-SCNC: 11 MMOL/L
AST SERPL-CCNC: 18 U/L
BASOPHILS # BLD AUTO: 0.03 K/UL
BASOPHILS NFR BLD AUTO: 0.3 %
BILIRUB SERPL-MCNC: <0.2 MG/DL
BUN SERPL-MCNC: 14 MG/DL
CALCIUM SERPL-MCNC: 9.9 MG/DL
CHLORIDE SERPL-SCNC: 105 MMOL/L
CO2 SERPL-SCNC: 23 MMOL/L
CREAT SERPL-MCNC: 0.73 MG/DL
EGFR: 108 ML/MIN/1.73M2
EOSINOPHIL # BLD AUTO: 0.07 K/UL
EOSINOPHIL NFR BLD AUTO: 0.7 %
FIBRINOGEN PPP COAG.DERIVED-MCNC: 678 MG/DL
GLUCOSE SERPL-MCNC: 87 MG/DL
HCT VFR BLD CALC: 33.2 %
HGB BLD-MCNC: 10.8 G/DL
IMM GRANULOCYTES NFR BLD AUTO: 0.7 %
LDH SERPL-CCNC: 175 U/L
LYMPHOCYTES # BLD AUTO: 1.69 K/UL
LYMPHOCYTES NFR BLD AUTO: 15.9 %
MAN DIFF?: NORMAL
MCHC RBC-ENTMCNC: 30.9 PG
MCHC RBC-ENTMCNC: 32.5 GM/DL
MCV RBC AUTO: 95.1 FL
MONOCYTES # BLD AUTO: 0.92 K/UL
MONOCYTES NFR BLD AUTO: 8.6 %
NEUTROPHILS # BLD AUTO: 7.87 K/UL
NEUTROPHILS NFR BLD AUTO: 73.8 %
PLATELET # BLD AUTO: 224 K/UL
POTASSIUM SERPL-SCNC: 4.6 MMOL/L
PROT SERPL-MCNC: 6.1 G/DL
RBC # BLD: 3.49 M/UL
RBC # FLD: 13.2 %
SODIUM SERPL-SCNC: 139 MMOL/L
URATE SERPL-MCNC: 5.6 MG/DL
WBC # FLD AUTO: 10.65 K/UL

## 2022-03-09 ENCOUNTER — APPOINTMENT (OUTPATIENT)
Dept: ANTEPARTUM | Facility: CLINIC | Age: 37
End: 2022-03-09
Payer: COMMERCIAL

## 2022-03-09 ENCOUNTER — NON-APPOINTMENT (OUTPATIENT)
Age: 37
End: 2022-03-09

## 2022-03-09 ENCOUNTER — ASOB RESULT (OUTPATIENT)
Age: 37
End: 2022-03-09

## 2022-03-09 PROCEDURE — 76818 FETAL BIOPHYS PROFILE W/NST: CPT

## 2022-03-10 ENCOUNTER — NON-APPOINTMENT (OUTPATIENT)
Age: 37
End: 2022-03-10

## 2022-03-10 ENCOUNTER — TRANSCRIPTION ENCOUNTER (OUTPATIENT)
Age: 37
End: 2022-03-10

## 2022-03-11 ENCOUNTER — TRANSCRIPTION ENCOUNTER (OUTPATIENT)
Age: 37
End: 2022-03-11

## 2022-03-11 ENCOUNTER — APPOINTMENT (OUTPATIENT)
Dept: OBGYN | Facility: CLINIC | Age: 37
End: 2022-03-11
Payer: COMMERCIAL

## 2022-03-11 ENCOUNTER — ASOB RESULT (OUTPATIENT)
Age: 37
End: 2022-03-11

## 2022-03-11 ENCOUNTER — NON-APPOINTMENT (OUTPATIENT)
Age: 37
End: 2022-03-11

## 2022-03-11 ENCOUNTER — APPOINTMENT (OUTPATIENT)
Dept: ANTEPARTUM | Facility: CLINIC | Age: 37
End: 2022-03-11
Payer: COMMERCIAL

## 2022-03-11 VITALS
SYSTOLIC BLOOD PRESSURE: 100 MMHG | WEIGHT: 162 LBS | OXYGEN SATURATION: 98 % | DIASTOLIC BLOOD PRESSURE: 70 MMHG | BODY MASS INDEX: 23.24 KG/M2

## 2022-03-11 PROCEDURE — 0502F SUBSEQUENT PRENATAL CARE: CPT

## 2022-03-11 PROCEDURE — 59425 ANTEPARTUM CARE ONLY: CPT

## 2022-03-11 PROCEDURE — 76818 FETAL BIOPHYS PROFILE W/NST: CPT

## 2022-03-11 PROCEDURE — 87075 CULTR BACTERIA EXCEPT BLOOD: CPT

## 2022-03-11 PROCEDURE — 36415 COLL VENOUS BLD VENIPUNCTURE: CPT

## 2022-03-11 PROCEDURE — 76816 OB US FOLLOW-UP PER FETUS: CPT

## 2022-03-11 PROCEDURE — 81002 URINALYSIS NONAUTO W/O SCOPE: CPT

## 2022-03-14 ENCOUNTER — NON-APPOINTMENT (OUTPATIENT)
Age: 37
End: 2022-03-14

## 2022-03-14 ENCOUNTER — TRANSCRIPTION ENCOUNTER (OUTPATIENT)
Age: 37
End: 2022-03-14

## 2022-03-14 LAB
ALBUMIN SERPL ELPH-MCNC: 4 G/DL
ALP BLD-CCNC: 519 U/L
ALT SERPL-CCNC: 25 U/L
ANION GAP SERPL CALC-SCNC: 13 MMOL/L
AST SERPL-CCNC: 20 U/L
BASOPHILS # BLD AUTO: 0.03 K/UL
BASOPHILS NFR BLD AUTO: 0.3 %
BILIRUB SERPL-MCNC: 0.2 MG/DL
BUN SERPL-MCNC: 13 MG/DL
CALCIUM SERPL-MCNC: 9.7 MG/DL
CHLORIDE SERPL-SCNC: 104 MMOL/L
CO2 SERPL-SCNC: 21 MMOL/L
CREAT SERPL-MCNC: 0.87 MG/DL
CREAT SPEC-SCNC: 86 MG/DL
CREAT/PROT UR: 0.2 RATIO
EGFR: 87 ML/MIN/1.73M2
EOSINOPHIL # BLD AUTO: 0.15 K/UL
EOSINOPHIL NFR BLD AUTO: 1.5 %
GLUCOSE SERPL-MCNC: 82 MG/DL
GP B STREP DNA SPEC QL NAA+PROBE: NORMAL
GP B STREP DNA SPEC QL NAA+PROBE: NOT DETECTED
HCT VFR BLD CALC: 35.7 %
HGB BLD-MCNC: 11.7 G/DL
IMM GRANULOCYTES NFR BLD AUTO: 0.6 %
LDH SERPL-CCNC: 177 U/L
LYMPHOCYTES # BLD AUTO: 1.51 K/UL
LYMPHOCYTES NFR BLD AUTO: 15 %
MAN DIFF?: NORMAL
MCHC RBC-ENTMCNC: 30.7 PG
MCHC RBC-ENTMCNC: 32.8 GM/DL
MCV RBC AUTO: 93.7 FL
MONOCYTES # BLD AUTO: 1.01 K/UL
MONOCYTES NFR BLD AUTO: 10 %
NEUTROPHILS # BLD AUTO: 7.34 K/UL
NEUTROPHILS NFR BLD AUTO: 72.6 %
PLATELET # BLD AUTO: 246 K/UL
POTASSIUM SERPL-SCNC: 5.2 MMOL/L
PROT SERPL-MCNC: 6.2 G/DL
PROT UR-MCNC: 14 MG/DL
RBC # BLD: 3.81 M/UL
RBC # FLD: 13.1 %
SODIUM SERPL-SCNC: 138 MMOL/L
SOURCE GBS: NORMAL
URATE SERPL-MCNC: 4.9 MG/DL
WBC # FLD AUTO: 10.1 K/UL

## 2022-03-15 ENCOUNTER — TRANSCRIPTION ENCOUNTER (OUTPATIENT)
Age: 37
End: 2022-03-15

## 2022-03-15 DIAGNOSIS — Z01.818 ENCOUNTER FOR OTHER PREPROCEDURAL EXAMINATION: ICD-10-CM

## 2022-03-16 ENCOUNTER — ASOB RESULT (OUTPATIENT)
Age: 37
End: 2022-03-16

## 2022-03-16 ENCOUNTER — TRANSCRIPTION ENCOUNTER (OUTPATIENT)
Age: 37
End: 2022-03-16

## 2022-03-16 ENCOUNTER — APPOINTMENT (OUTPATIENT)
Dept: ANTEPARTUM | Facility: CLINIC | Age: 37
End: 2022-03-16
Payer: COMMERCIAL

## 2022-03-16 PROCEDURE — 76816 OB US FOLLOW-UP PER FETUS: CPT

## 2022-03-16 PROCEDURE — 76818 FETAL BIOPHYS PROFILE W/NST: CPT

## 2022-03-18 ENCOUNTER — APPOINTMENT (OUTPATIENT)
Dept: ANTEPARTUM | Facility: CLINIC | Age: 37
End: 2022-03-18
Payer: COMMERCIAL

## 2022-03-18 ENCOUNTER — ASOB RESULT (OUTPATIENT)
Age: 37
End: 2022-03-18

## 2022-03-18 PROCEDURE — 76819 FETAL BIOPHYS PROFIL W/O NST: CPT

## 2022-03-20 ENCOUNTER — LABORATORY RESULT (OUTPATIENT)
Age: 37
End: 2022-03-20

## 2022-03-21 ENCOUNTER — TRANSCRIPTION ENCOUNTER (OUTPATIENT)
Age: 37
End: 2022-03-21

## 2022-03-22 ENCOUNTER — NON-APPOINTMENT (OUTPATIENT)
Age: 37
End: 2022-03-22

## 2022-03-22 ENCOUNTER — APPOINTMENT (OUTPATIENT)
Dept: ANTEPARTUM | Facility: CLINIC | Age: 37
End: 2022-03-22
Payer: COMMERCIAL

## 2022-03-22 ENCOUNTER — ASOB RESULT (OUTPATIENT)
Age: 37
End: 2022-03-22

## 2022-03-22 PROCEDURE — 76819 FETAL BIOPHYS PROFIL W/O NST: CPT

## 2022-03-23 ENCOUNTER — TRANSCRIPTION ENCOUNTER (OUTPATIENT)
Age: 37
End: 2022-03-23

## 2022-03-23 ENCOUNTER — INPATIENT (INPATIENT)
Facility: HOSPITAL | Age: 37
LOS: 2 days | Discharge: HOME | End: 2022-03-26
Attending: OBSTETRICS & GYNECOLOGY | Admitting: OBSTETRICS & GYNECOLOGY
Payer: COMMERCIAL

## 2022-03-23 RX ORDER — OXYTOCIN 10 UNIT/ML
333.33 VIAL (ML) INJECTION
Qty: 20 | Refills: 0 | Status: DISCONTINUED | OUTPATIENT
Start: 2022-03-23 | End: 2022-03-24

## 2022-03-23 RX ORDER — SODIUM CHLORIDE 9 MG/ML
1000 INJECTION, SOLUTION INTRAVENOUS
Refills: 0 | Status: DISCONTINUED | OUTPATIENT
Start: 2022-03-23 | End: 2022-03-24

## 2022-03-23 RX ORDER — CITRIC ACID/SODIUM CITRATE 300-500 MG
15 SOLUTION, ORAL ORAL EVERY 6 HOURS
Refills: 0 | Status: DISCONTINUED | OUTPATIENT
Start: 2022-03-23 | End: 2022-03-24

## 2022-03-24 ENCOUNTER — RESULT REVIEW (OUTPATIENT)
Age: 37
End: 2022-03-24

## 2022-03-24 VITALS — HEIGHT: 70 IN | WEIGHT: 164.02 LBS

## 2022-03-24 LAB
ALBUMIN SERPL ELPH-MCNC: 3.8 G/DL — SIGNIFICANT CHANGE UP (ref 3.3–5)
ALP SERPL-CCNC: 575 U/L — HIGH (ref 40–120)
ALT FLD-CCNC: 14 U/L — SIGNIFICANT CHANGE UP (ref 10–45)
ANION GAP SERPL CALC-SCNC: 11 MMOL/L — SIGNIFICANT CHANGE UP (ref 5–17)
APTT BLD: 26.3 SEC — LOW (ref 27.5–35.5)
AST SERPL-CCNC: 18 U/L — SIGNIFICANT CHANGE UP (ref 10–40)
BASOPHILS # BLD AUTO: 0.03 K/UL — SIGNIFICANT CHANGE UP (ref 0–0.2)
BASOPHILS NFR BLD AUTO: 0.3 % — SIGNIFICANT CHANGE UP (ref 0–2)
BILIRUB SERPL-MCNC: 0.2 MG/DL — SIGNIFICANT CHANGE UP (ref 0.2–1.2)
BLD GP AB SCN SERPL QL: POSITIVE — SIGNIFICANT CHANGE UP
BUN SERPL-MCNC: 10 MG/DL — SIGNIFICANT CHANGE UP (ref 7–23)
CALCIUM SERPL-MCNC: 9.9 MG/DL — SIGNIFICANT CHANGE UP (ref 8.4–10.5)
CHLORIDE SERPL-SCNC: 104 MMOL/L — SIGNIFICANT CHANGE UP (ref 96–108)
CO2 SERPL-SCNC: 22 MMOL/L — SIGNIFICANT CHANGE UP (ref 22–31)
COVID-19 SPIKE DOMAIN AB INTERP: POSITIVE
COVID-19 SPIKE DOMAIN ANTIBODY RESULT: >250 U/ML — HIGH
CREAT ?TM UR-MCNC: 63 MG/DL — SIGNIFICANT CHANGE UP
CREAT SERPL-MCNC: 0.56 MG/DL — SIGNIFICANT CHANGE UP (ref 0.5–1.3)
EGFR: 120 ML/MIN/1.73M2 — SIGNIFICANT CHANGE UP
EOSINOPHIL # BLD AUTO: 0.1 K/UL — SIGNIFICANT CHANGE UP (ref 0–0.5)
EOSINOPHIL NFR BLD AUTO: 0.9 % — SIGNIFICANT CHANGE UP (ref 0–6)
FIBRINOGEN PPP-MCNC: 497 MG/DL — HIGH (ref 258–438)
GLUCOSE SERPL-MCNC: 77 MG/DL — SIGNIFICANT CHANGE UP (ref 70–99)
HCT VFR BLD CALC: 35.8 % — SIGNIFICANT CHANGE UP (ref 34.5–45)
HGB BLD-MCNC: 12.2 G/DL — SIGNIFICANT CHANGE UP (ref 11.5–15.5)
IMM GRANULOCYTES NFR BLD AUTO: 0.5 % — SIGNIFICANT CHANGE UP (ref 0–1.5)
INR BLD: 0.93 — SIGNIFICANT CHANGE UP (ref 0.88–1.16)
LDH SERPL L TO P-CCNC: 189 U/L — SIGNIFICANT CHANGE UP (ref 50–242)
LYMPHOCYTES # BLD AUTO: 17.7 % — SIGNIFICANT CHANGE UP (ref 13–44)
LYMPHOCYTES # BLD AUTO: 2.02 K/UL — SIGNIFICANT CHANGE UP (ref 1–3.3)
MCHC RBC-ENTMCNC: 31.4 PG — SIGNIFICANT CHANGE UP (ref 27–34)
MCHC RBC-ENTMCNC: 34.1 GM/DL — SIGNIFICANT CHANGE UP (ref 32–36)
MCV RBC AUTO: 92.3 FL — SIGNIFICANT CHANGE UP (ref 80–100)
MONOCYTES # BLD AUTO: 0.87 K/UL — SIGNIFICANT CHANGE UP (ref 0–0.9)
MONOCYTES NFR BLD AUTO: 7.6 % — SIGNIFICANT CHANGE UP (ref 2–14)
NEUTROPHILS # BLD AUTO: 8.32 K/UL — HIGH (ref 1.8–7.4)
NEUTROPHILS NFR BLD AUTO: 73 % — SIGNIFICANT CHANGE UP (ref 43–77)
NRBC # BLD: 0 /100 WBCS — SIGNIFICANT CHANGE UP (ref 0–0)
PLATELET # BLD AUTO: 226 K/UL — SIGNIFICANT CHANGE UP (ref 150–400)
POTASSIUM SERPL-MCNC: 4 MMOL/L — SIGNIFICANT CHANGE UP (ref 3.5–5.3)
POTASSIUM SERPL-SCNC: 4 MMOL/L — SIGNIFICANT CHANGE UP (ref 3.5–5.3)
PROT ?TM UR-MCNC: 12 MG/DL — SIGNIFICANT CHANGE UP (ref 0–12)
PROT SERPL-MCNC: 6.7 G/DL — SIGNIFICANT CHANGE UP (ref 6–8.3)
PROT/CREAT UR-RTO: 0.2 RATIO — SIGNIFICANT CHANGE UP (ref 0–0.2)
PROTHROM AB SERPL-ACNC: 11 SEC — SIGNIFICANT CHANGE UP (ref 10.5–13.4)
RBC # BLD: 3.88 M/UL — SIGNIFICANT CHANGE UP (ref 3.8–5.2)
RBC # FLD: 12.5 % — SIGNIFICANT CHANGE UP (ref 10.3–14.5)
RH IG SCN BLD-IMP: NEGATIVE — SIGNIFICANT CHANGE UP
RH IG SCN BLD-IMP: NEGATIVE — SIGNIFICANT CHANGE UP
SARS-COV-2 IGG+IGM SERPL QL IA: >250 U/ML — HIGH
SARS-COV-2 IGG+IGM SERPL QL IA: POSITIVE
SODIUM SERPL-SCNC: 137 MMOL/L — SIGNIFICANT CHANGE UP (ref 135–145)
T PALLIDUM AB TITR SER: NEGATIVE — SIGNIFICANT CHANGE UP
URATE SERPL-MCNC: 5 MG/DL — SIGNIFICANT CHANGE UP (ref 2.5–7)
WBC # BLD: 11.4 K/UL — HIGH (ref 3.8–10.5)
WBC # FLD AUTO: 11.4 K/UL — HIGH (ref 3.8–10.5)

## 2022-03-24 PROCEDURE — 86077 PHYS BLOOD BANK SERV XMATCH: CPT

## 2022-03-24 PROCEDURE — 59400 OBSTETRICAL CARE: CPT

## 2022-03-24 PROCEDURE — 88307 TISSUE EXAM BY PATHOLOGIST: CPT | Mod: 26

## 2022-03-24 RX ORDER — KETOROLAC TROMETHAMINE 30 MG/ML
30 SYRINGE (ML) INJECTION ONCE
Refills: 0 | Status: DISCONTINUED | OUTPATIENT
Start: 2022-03-24 | End: 2022-03-24

## 2022-03-24 RX ORDER — LANOLIN
1 OINTMENT (GRAM) TOPICAL EVERY 6 HOURS
Refills: 0 | Status: DISCONTINUED | OUTPATIENT
Start: 2022-03-24 | End: 2022-03-26

## 2022-03-24 RX ORDER — BENZOCAINE 10 %
1 GEL (GRAM) MUCOUS MEMBRANE EVERY 6 HOURS
Refills: 0 | Status: DISCONTINUED | OUTPATIENT
Start: 2022-03-24 | End: 2022-03-26

## 2022-03-24 RX ORDER — FENTANYL/BUPIVACAINE/NS/PF 2MCG/ML-.1
250 PLASTIC BAG, INJECTION (ML) INJECTION
Refills: 0 | Status: DISCONTINUED | OUTPATIENT
Start: 2022-03-24 | End: 2022-03-24

## 2022-03-24 RX ORDER — OXYTOCIN 10 UNIT/ML
2 VIAL (ML) INJECTION
Qty: 30 | Refills: 0 | Status: DISCONTINUED | OUTPATIENT
Start: 2022-03-24 | End: 2022-03-24

## 2022-03-24 RX ORDER — ACETAMINOPHEN 500 MG
975 TABLET ORAL
Refills: 0 | Status: DISCONTINUED | OUTPATIENT
Start: 2022-03-24 | End: 2022-03-26

## 2022-03-24 RX ORDER — OXYCODONE HYDROCHLORIDE 5 MG/1
5 TABLET ORAL
Refills: 0 | Status: DISCONTINUED | OUTPATIENT
Start: 2022-03-24 | End: 2022-03-26

## 2022-03-24 RX ORDER — AER TRAVELER 0.5 G/1
1 SOLUTION RECTAL; TOPICAL EVERY 4 HOURS
Refills: 0 | Status: DISCONTINUED | OUTPATIENT
Start: 2022-03-24 | End: 2022-03-26

## 2022-03-24 RX ORDER — MAGNESIUM HYDROXIDE 400 MG/1
30 TABLET, CHEWABLE ORAL
Refills: 0 | Status: DISCONTINUED | OUTPATIENT
Start: 2022-03-24 | End: 2022-03-26

## 2022-03-24 RX ORDER — IBUPROFEN 200 MG
600 TABLET ORAL EVERY 6 HOURS
Refills: 0 | Status: COMPLETED | OUTPATIENT
Start: 2022-03-24 | End: 2023-02-20

## 2022-03-24 RX ORDER — PRAMOXINE HYDROCHLORIDE 150 MG/15G
1 AEROSOL, FOAM RECTAL EVERY 4 HOURS
Refills: 0 | Status: DISCONTINUED | OUTPATIENT
Start: 2022-03-24 | End: 2022-03-26

## 2022-03-24 RX ORDER — HYDROCORTISONE 1 %
1 OINTMENT (GRAM) TOPICAL EVERY 6 HOURS
Refills: 0 | Status: DISCONTINUED | OUTPATIENT
Start: 2022-03-24 | End: 2022-03-26

## 2022-03-24 RX ORDER — TETANUS TOXOID, REDUCED DIPHTHERIA TOXOID AND ACELLULAR PERTUSSIS VACCINE, ADSORBED 5; 2.5; 8; 8; 2.5 [IU]/.5ML; [IU]/.5ML; UG/.5ML; UG/.5ML; UG/.5ML
0.5 SUSPENSION INTRAMUSCULAR ONCE
Refills: 0 | Status: DISCONTINUED | OUTPATIENT
Start: 2022-03-24 | End: 2022-03-26

## 2022-03-24 RX ORDER — OXYTOCIN 10 UNIT/ML
333.33 VIAL (ML) INJECTION
Qty: 20 | Refills: 0 | Status: DISCONTINUED | OUTPATIENT
Start: 2022-03-24 | End: 2022-03-26

## 2022-03-24 RX ORDER — SIMETHICONE 80 MG/1
80 TABLET, CHEWABLE ORAL EVERY 4 HOURS
Refills: 0 | Status: DISCONTINUED | OUTPATIENT
Start: 2022-03-24 | End: 2022-03-26

## 2022-03-24 RX ORDER — ACETAMINOPHEN 500 MG
975 TABLET ORAL ONCE
Refills: 0 | Status: COMPLETED | OUTPATIENT
Start: 2022-03-24 | End: 2022-03-24

## 2022-03-24 RX ORDER — DIPHENHYDRAMINE HCL 50 MG
25 CAPSULE ORAL EVERY 6 HOURS
Refills: 0 | Status: DISCONTINUED | OUTPATIENT
Start: 2022-03-24 | End: 2022-03-26

## 2022-03-24 RX ORDER — DIBUCAINE 1 %
1 OINTMENT (GRAM) RECTAL EVERY 6 HOURS
Refills: 0 | Status: DISCONTINUED | OUTPATIENT
Start: 2022-03-24 | End: 2022-03-26

## 2022-03-24 RX ORDER — SODIUM CHLORIDE 9 MG/ML
3 INJECTION INTRAMUSCULAR; INTRAVENOUS; SUBCUTANEOUS EVERY 8 HOURS
Refills: 0 | Status: DISCONTINUED | OUTPATIENT
Start: 2022-03-24 | End: 2022-03-26

## 2022-03-24 RX ORDER — OXYCODONE HYDROCHLORIDE 5 MG/1
5 TABLET ORAL ONCE
Refills: 0 | Status: DISCONTINUED | OUTPATIENT
Start: 2022-03-24 | End: 2022-03-26

## 2022-03-24 RX ADMIN — Medication 975 MILLIGRAM(S): at 20:30

## 2022-03-24 RX ADMIN — Medication 2 MILLIUNIT(S)/MIN: at 01:15

## 2022-03-24 RX ADMIN — SODIUM CHLORIDE 125 MILLILITER(S): 9 INJECTION, SOLUTION INTRAVENOUS at 23:45

## 2022-03-24 RX ADMIN — Medication 1000 MILLIUNIT(S)/MIN: at 19:40

## 2022-03-24 RX ADMIN — Medication 975 MILLIGRAM(S): at 01:25

## 2022-03-24 RX ADMIN — Medication 20 MILLIGRAM(S): at 10:12

## 2022-03-24 RX ADMIN — Medication 600 MILLIGRAM(S): at 19:30

## 2022-03-24 RX ADMIN — Medication 975 MILLIGRAM(S): at 17:55

## 2022-03-24 RX ADMIN — Medication 30 MILLIGRAM(S): at 20:56

## 2022-03-24 RX ADMIN — Medication 975 MILLIGRAM(S): at 01:52

## 2022-03-24 NOTE — PATIENT PROFILE OB - FALL HARM RISK - UNIVERSAL INTERVENTIONS
Bed in lowest position, wheels locked, appropriate side rails in place/Call bell, personal items and telephone in reach/Instruct patient to call for assistance before getting out of bed or chair/Non-slip footwear when patient is out of bed/[place] to call system/Physically safe environment - no spills, clutter or unnecessary equipment/Purposeful Proactive Rounding/Room/bathroom lighting operational, light cord in reach

## 2022-03-25 ENCOUNTER — TRANSCRIPTION ENCOUNTER (OUTPATIENT)
Age: 37
End: 2022-03-25

## 2022-03-25 RX ORDER — IBUPROFEN 200 MG
600 TABLET ORAL EVERY 6 HOURS
Refills: 0 | Status: DISCONTINUED | OUTPATIENT
Start: 2022-03-25 | End: 2022-03-26

## 2022-03-25 RX ADMIN — Medication 975 MILLIGRAM(S): at 21:30

## 2022-03-25 RX ADMIN — Medication 600 MILLIGRAM(S): at 13:10

## 2022-03-25 RX ADMIN — Medication 600 MILLIGRAM(S): at 07:08

## 2022-03-25 RX ADMIN — Medication 975 MILLIGRAM(S): at 21:00

## 2022-03-25 RX ADMIN — Medication 600 MILLIGRAM(S): at 12:24

## 2022-03-25 RX ADMIN — Medication 600 MILLIGRAM(S): at 18:54

## 2022-03-25 RX ADMIN — Medication 1 APPLICATION(S): at 18:55

## 2022-03-25 RX ADMIN — Medication 975 MILLIGRAM(S): at 10:07

## 2022-03-25 RX ADMIN — Medication 975 MILLIGRAM(S): at 15:15

## 2022-03-25 RX ADMIN — Medication 1 SPRAY(S): at 18:55

## 2022-03-25 RX ADMIN — Medication 975 MILLIGRAM(S): at 14:43

## 2022-03-25 RX ADMIN — Medication 1 TABLET(S): at 12:25

## 2022-03-25 RX ADMIN — Medication 975 MILLIGRAM(S): at 09:22

## 2022-03-25 RX ADMIN — Medication 600 MILLIGRAM(S): at 06:38

## 2022-03-25 NOTE — LACTATION INITIAL EVALUATION - NS LACT CON REASON FOR REQ
Normal late pre-term behaviour and reasoning for initiating triple feeding plan of care was explained to the mother and handouts given for reference. Breast pump will be brought to bedside and instructions for use taught. Pump protocol reviewed and implementing hand expression also encouraged. Mother understands supplementation with formula is likely necessary as a temporary measure until baby able to tranfer adequate amounts of milk from breast. Paced-bottle feeding with a slow flow nipple or spoon feeding was recommended./general questions without assessment/primaparous mom/early term/late  infant/staff request/NICU admission

## 2022-03-25 NOTE — LACTATION INITIAL EVALUATION - LACTATION INTERVENTIONS
initiate/review safe skin-to-skin/initiate/review hand expression/initiate/review pumping guidelines and safe milk handling/post discharge community resources provided/initiate/review supplementation plan due to medical indications/initiate/review alternate feeding method/reviewed components of an effective feeding and at least 8 effective feedings per day required/reviewed importance of monitoring infant diapers, the breastfeeding log, and minimum output each day/reviewed feeding on demand/by cue at least 8 times a day/reviewed indications of inadequate milk transfer that would require supplementation

## 2022-03-26 ENCOUNTER — TRANSCRIPTION ENCOUNTER (OUTPATIENT)
Age: 37
End: 2022-03-26

## 2022-03-26 VITALS
SYSTOLIC BLOOD PRESSURE: 116 MMHG | OXYGEN SATURATION: 98 % | TEMPERATURE: 98 F | DIASTOLIC BLOOD PRESSURE: 71 MMHG | RESPIRATION RATE: 18 BRPM | HEART RATE: 72 BPM

## 2022-03-26 PROCEDURE — 88307 TISSUE EXAM BY PATHOLOGIST: CPT

## 2022-03-26 PROCEDURE — 85025 COMPLETE CBC W/AUTO DIFF WBC: CPT

## 2022-03-26 PROCEDURE — 84550 ASSAY OF BLOOD/URIC ACID: CPT

## 2022-03-26 PROCEDURE — 84156 ASSAY OF PROTEIN URINE: CPT

## 2022-03-26 PROCEDURE — 85384 FIBRINOGEN ACTIVITY: CPT

## 2022-03-26 PROCEDURE — 86870 RBC ANTIBODY IDENTIFICATION: CPT

## 2022-03-26 PROCEDURE — 83615 LACTATE (LD) (LDH) ENZYME: CPT

## 2022-03-26 PROCEDURE — 59050 FETAL MONITOR W/REPORT: CPT

## 2022-03-26 PROCEDURE — 85730 THROMBOPLASTIN TIME PARTIAL: CPT

## 2022-03-26 PROCEDURE — 85610 PROTHROMBIN TIME: CPT

## 2022-03-26 PROCEDURE — 86900 BLOOD TYPING SEROLOGIC ABO: CPT

## 2022-03-26 PROCEDURE — 82570 ASSAY OF URINE CREATININE: CPT

## 2022-03-26 PROCEDURE — 86850 RBC ANTIBODY SCREEN: CPT

## 2022-03-26 PROCEDURE — 86880 COOMBS TEST DIRECT: CPT

## 2022-03-26 PROCEDURE — 86769 SARS-COV-2 COVID-19 ANTIBODY: CPT

## 2022-03-26 PROCEDURE — 36415 COLL VENOUS BLD VENIPUNCTURE: CPT

## 2022-03-26 PROCEDURE — 86780 TREPONEMA PALLIDUM: CPT

## 2022-03-26 PROCEDURE — 86901 BLOOD TYPING SEROLOGIC RH(D): CPT

## 2022-03-26 PROCEDURE — 80053 COMPREHEN METABOLIC PANEL: CPT

## 2022-03-26 RX ORDER — ACETAMINOPHEN 500 MG
3 TABLET ORAL
Qty: 0 | Refills: 0 | DISCHARGE
Start: 2022-03-26

## 2022-03-26 RX ORDER — IBUPROFEN 200 MG
1 TABLET ORAL
Qty: 0 | Refills: 0 | DISCHARGE
Start: 2022-03-26

## 2022-03-26 RX ADMIN — Medication 600 MILLIGRAM(S): at 00:00

## 2022-03-26 RX ADMIN — Medication 600 MILLIGRAM(S): at 01:00

## 2022-03-26 RX ADMIN — Medication 600 MILLIGRAM(S): at 06:09

## 2022-03-26 RX ADMIN — Medication 600 MILLIGRAM(S): at 06:44

## 2022-03-26 NOTE — DISCHARGE NOTE OB - CARE PLAN
Principal Discharge DX:	Postpartum state  Assessment and plan of treatment:	Follow up in the office with your doctor in six weeks. Please call to confirm your appointment. You can eat a regular diet. Please take 600mg Mortin every 6 hours and/or Tylenol 975mg every six hours as needed for pain. No heavy lifting and nothing in the vagina until cleared by your doctor. Please call your doctor with any questions or concerns.   1

## 2022-03-26 NOTE — PROGRESS NOTE ADULT - REASON FOR ADMISSION
Induction of labor for IUGR at 37 weeks gestation
Pregnancy
induction of labor for SGA infant
pregnancy

## 2022-03-26 NOTE — DISCHARGE NOTE OB - CARE PROVIDER_API CALL
Heidy Valdez (CNM; NP; RN)  Midwifery  [address] [place] [address]  Phone: [phone]  Fax: [phone]  Follow Up Time:

## 2022-03-26 NOTE — PROGRESS NOTE ADULT - SUBJECTIVE AND OBJECTIVE BOX
Patient evaluated at bedside this morning, resting comfortable in bed, no acute events overnight.  She reports pain is well controlled with tylenol and motrin  She denies headache, dizziness, chest pain, palpitations, shortness of breath, nausea, vomiting, heavy vaginal bleeding or perineal discomfort. Reports decrease in amount of vaginal bleeding and denies clots.  She has been ambulating without assistance, voiding spontaneously, and is breastfeeding.   Tolerating food well, without nausea/vomit.  Passing flatus.     Physical Exam:  T(C): 36.7 (03-25-22 @ 05:35), Max: 36.7 (03-24-22 @ 22:40)  HR: 72 (03-25-22 @ 05:35) (72 - 80)  BP: 100/64 (03-25-22 @ 05:35) (100/64 - 115/75)  RR: 18 (03-25-22 @ 05:35) (18 - 18)  SpO2: 98% (03-25-22 @ 05:35) (97% - 98%)    GA: NAD, A&O x 3  Pulm: normal respiratory rate  Abd: + BS, soft, nontender, nondistended, no rebound or guarding, uterus firm at midline and 2  fb below umbilicus  Extremities: no swelling or calf tenderness                          12.2   11.40 )-----------( 226      ( 24 Mar 2022 00:17 )             35.8     03-24    137  |  104  |  10  ----------------------------<  77  4.0   |  22  |  0.56    Ca    9.9      24 Mar 2022 00:17    TPro  6.7  /  Alb  3.8  /  TBili  0.2  /  DBili  x   /  AST  18  /  ALT  14  /  AlkPhos  575<H>  03-24    acetaminophen     Tablet .. 975 milliGRAM(s) Oral <User Schedule>  benzocaine 20%/menthol 0.5% Spray 1 Spray(s) Topical every 6 hours PRN  dibucaine 1% Ointment 1 Application(s) Topical every 6 hours PRN  diphenhydrAMINE 25 milliGRAM(s) Oral every 6 hours PRN  diphtheria/tetanus/pertussis (acellular) Vaccine (ADAcel) 0.5 milliLiter(s) IntraMuscular once  hydrocortisone 1% Cream 1 Application(s) Topical every 6 hours PRN  ibuprofen  Tablet. 600 milliGRAM(s) Oral every 6 hours  lanolin Ointment 1 Application(s) Topical every 6 hours PRN  magnesium hydroxide Suspension 30 milliLiter(s) Oral two times a day PRN  oxyCODONE    IR 5 milliGRAM(s) Oral every 3 hours PRN  oxyCODONE    IR 5 milliGRAM(s) Oral once PRN  oxytocin Infusion 333.333 milliUNIT(s)/Min IV Continuous <Continuous>  pramoxine 1%/zinc 5% Cream 1 Application(s) Topical every 4 hours PRN  prenatal multivitamin 1 Tablet(s) Oral daily  simethicone 80 milliGRAM(s) Chew every 4 hours PRN  sodium chloride 0.9% lock flush 3 milliLiter(s) IV Push every 8 hours  witch hazel Pads 1 Application(s) Topical every 4 hours PRN  
Patient evaluated at bedside this morning, resting comfortable in bed, no acute events overnight.  She reports pain is well controlled with tylenol and motrin  She denies headache, dizziness, chest pain, palpitations, shortness of breath, nausea, vomiting, heavy vaginal bleeding or perineal discomfort. Reports decrease in amount of vaginal bleeding and denies clots.  She has been ambulating without assistance, voiding spontaneously.  Tolerating food well, without nausea/vomit.  Passing flatus.     Physical Exam:  T(C): 36.4 (03-26-22 @ 05:50), Max: 37 (03-25-22 @ 22:00)  HR: 72 (03-26-22 @ 05:50) (72 - 85)  BP: 116/71 (03-26-22 @ 05:50) (114/75 - 116/71)  RR: 18 (03-26-22 @ 05:50) (18 - 18)  SpO2: 98% (03-26-22 @ 05:50) (97% - 98%)    GA: NAD, A&O x 3  Abd: + BS, soft, nontender, nondistended, no rebound or guarding, uterus firm at midline and 2  fb below umbilicus  Perineum: normal lochia, intact, healing well, no hematoma  Extremities: no swelling or calf tenderness            acetaminophen     Tablet .. 975 milliGRAM(s) Oral <User Schedule>  benzocaine 20%/menthol 0.5% Spray 1 Spray(s) Topical every 6 hours PRN  dibucaine 1% Ointment 1 Application(s) Topical every 6 hours PRN  diphenhydrAMINE 25 milliGRAM(s) Oral every 6 hours PRN  diphtheria/tetanus/pertussis (acellular) Vaccine (ADAcel) 0.5 milliLiter(s) IntraMuscular once  hydrocortisone 1% Cream 1 Application(s) Topical every 6 hours PRN  ibuprofen  Tablet. 600 milliGRAM(s) Oral every 6 hours  lanolin Ointment 1 Application(s) Topical every 6 hours PRN  magnesium hydroxide Suspension 30 milliLiter(s) Oral two times a day PRN  oxyCODONE    IR 5 milliGRAM(s) Oral every 3 hours PRN  oxyCODONE    IR 5 milliGRAM(s) Oral once PRN  oxytocin Infusion 333.333 milliUNIT(s)/Min IV Continuous <Continuous>  pramoxine 1%/zinc 5% Cream 1 Application(s) Topical every 4 hours PRN  prenatal multivitamin 1 Tablet(s) Oral daily  simethicone 80 milliGRAM(s) Chew every 4 hours PRN  sodium chloride 0.9% lock flush 3 milliLiter(s) IV Push every 8 hours  witch hazel Pads 1 Application(s) Topical every 4 hours PRN  
Hali is currently postpartum day 1 and is feeling well.  Her baby was initially taken to NICU for evaluation due to low birth weight but was then transferred to nursery care.  Infant was fed formula overnight as requested by Hali.   Hali is ambulating without difficulty.  She is eating and hydrating well.  She reports being able to void without difficulty.  She would like to breastfeed but has not started yet.  She states she was going to use breast pump overnight but was told by nursing to wait to start until the morning.    She is using Motrin and Tylenol for pain relief.    ROS:   Breasts: Denies pain or tenderness.  She states she is planning to start breast feeding this morning.  Abdomen: reports occasional uterine cramping  : States minimal lochia rubra without clots.  Denies pain at laceration site.  Extremities: denies pain, edema or areas of redness    Exam:  AAOX3  Vital Signs Last 24 Hrs  T(C): 36.7 (25 Mar 2022 05:35), Max: 36.9 (24 Mar 2022 21:00)  T(F): 98 (25 Mar 2022 05:35), Max: 98.4 (24 Mar 2022 21:00)  HR: 72 (25 Mar 2022 05:35) (72 - 94)  BP: 100/64 (25 Mar 2022 05:35) (100/64 - 120/87)  BP(mean): 84 (24 Mar 2022 22:40) (84 - 84)  RR: 18 (25 Mar 2022 05:35) (17 - 18)  SpO2: 98% (25 Mar 2022 05:35) (97% - 100%)                        12.2   11.40 )-----------( 226      ( 24 Mar 2022 00:17 )             35.8   Breasts: soft, non-tender intact.  Infant formula fed while in NICU and then nursery.  Abdomen: Fundus firm 1 below umbilicus and non-tender  Perineum: sutures intact, minimal lochia without clots, small amount of perineal edema.  She has ice pack in place as well.   Extremities: Non-tender, without edema or areas redness  
Post Partum Day 2   Patient states she is feeling well, concerned about her baby's well being/initiating breastfeeding    Vitals WNL, patient is afebrile.  Breasts: colostrum  Fundus: firm to palpation, -1  Bowels:  did have movement this morning  Urination: WNL  Legs: WNL, no calf pain no swelling  Perineium:  well approximated,healing  Lochia:  WNL for post partum day 2

## 2022-03-26 NOTE — DISCHARGE NOTE OB - NS MD DC FALL RISK RISK
For information on Fall & Injury Prevention, visit: [email or web address] OR  [email or web address] OR  https://www.cdc.gov/steadi/patient.html

## 2022-03-26 NOTE — DISCHARGE NOTE OB - PATIENT PORTAL LINK FT
You can access the FollowMyHealth Patient Portal offered by [facility] by registering at the following website: [email or web address]. By joining [facility]’s FollowMyHealth portal, you will also be able to view your health information using other applications (apps) compatible with our system.

## 2022-03-26 NOTE — PROGRESS NOTE ADULT - ASSESSMENT
1. Postpartum day 1 - stable, supportive care offered  2. Plans to breastfeed - will be seen by lactation support, reviewed she will need to put baby to breast on demand.  Review demand-supply cycle with breastfeeding.  She is fearful of "not doing something right".  Encouraged Hali to have infant room-in with her so that she and  can learn and understand feeding cues and to bond with infant.  She is scared that the baby will cry.  Reframed with Hali that crying is one of the ways infant are able to communicate with us.  Reviewed by learning how her baby communicates with her will create a strong cortes.  3. Hali is Rh negative - infant girl blood type in B neg.  4. Perineal care reviewed and to start stiz bath 24 hours after delivery.  5. Continue with good nutrition and hydration.    6. Anticipate discharge home tomorrow.  Hali and her  verbalized understanding of all information and had no further questions.  She is aware she can call us at any time with questions or concerns.
Post Partum Day 2  Pt is doing well,  at bedside. They were very happy to hear that baby's bilirubin dropped. Pediatrician told TAINA Zhou that baby can go home tonight at 9pm after being under lights until 6pm. Peds released baby to patient's room for bonding and breastfeeding.  I facilitated latch on, guidance, and education for latching and positioning baby at breast for football and side lying position, and baby successfully latched on.    Patient is building confidence in caring for her baby.  Breasts WNL bilaterally colostrum  Review breastfeeding with patient, positioning and latch on  Fundus: firm to palpation -2  Lochia:  WNL for day 2 post partum  Legs: WNL no calf pain or swelling  Perineum: WNL well approximated    Discuss post partum care, kegels, and discharge for this evening.   Review rest, hydration, nutrition.  Review importance of hiring a lactation consultant to come to the home to help with latch on.    Pt will call midwife if needs further help/support    Pt and  verbalize understanding of all instructions.    Discharge home this evening.     
A/P 36y s/p , PPD #1  , stable, meeting postpartum milestones   - Pain: well controlled on oral pain meds  - GI: Tolerating regular diet  - : urinating without difficulty/pain  -DVT prophylaxis: ambulating frequently  -Dispo: PPD 2, unless otherwise specified  
A/P 36y s/p , PPD 2#  , stable, meeting postpartum milestones   - Pain: well controlled on OPM  - GI: Tolerating regular diet  - : urinating without difficulty/pain  -DVT prophylaxis: ambulating frequently  -Dispo: PPD 2, unless otherwise specified

## 2022-03-26 NOTE — DISCHARGE NOTE OB - MEDICATION SUMMARY - MEDICATIONS TO TAKE
I will START or STAY ON the medications listed below when I get home from the hospital:    acetaminophen 325 mg oral tablet  -- 3 tab(s) by mouth every 6 hours  -- Indication: For Postpartum state    ibuprofen 600 mg oral tablet  -- 1 tab(s) by mouth every 6 hours  -- Indication: For Postpartum state

## 2022-03-26 NOTE — PROGRESS NOTE ADULT - NUTRITIONAL ASSESSMENT
Review importance of eating like she is pregnant, hydrating often especially while breastfeeding. Discuss supply = demand for building milk supply, and patient verbalizes information.    Pt will continue taking her Floradix Liquid iron with herbs.    Pt will continue taking her prenatal vitamins and Vit D supplementation.

## 2022-03-26 NOTE — DISCHARGE NOTE OB - PLAN OF CARE
Follow up in the office with your doctor in six weeks. Please call to confirm your appointment. You can eat a regular diet. Please take 600mg Mortin every 6 hours and/or Tylenol 975mg every six hours as needed for pain. No heavy lifting and nothing in the vagina until cleared by your doctor. Please call your doctor with any questions or concerns.

## 2022-03-27 ENCOUNTER — TRANSCRIPTION ENCOUNTER (OUTPATIENT)
Age: 37
End: 2022-03-27

## 2022-03-30 LAB — SURGICAL PATHOLOGY STUDY: SIGNIFICANT CHANGE UP

## 2022-03-31 ENCOUNTER — TRANSCRIPTION ENCOUNTER (OUTPATIENT)
Age: 37
End: 2022-03-31

## 2022-04-01 ENCOUNTER — TRANSCRIPTION ENCOUNTER (OUTPATIENT)
Age: 37
End: 2022-04-01

## 2022-04-01 ENCOUNTER — NON-APPOINTMENT (OUTPATIENT)
Age: 37
End: 2022-04-01

## 2022-04-04 ENCOUNTER — TRANSCRIPTION ENCOUNTER (OUTPATIENT)
Age: 37
End: 2022-04-04

## 2022-04-04 ENCOUNTER — NON-APPOINTMENT (OUTPATIENT)
Age: 37
End: 2022-04-04

## 2022-04-05 DIAGNOSIS — N80.9 ENDOMETRIOSIS, UNSPECIFIED: ICD-10-CM

## 2022-04-05 DIAGNOSIS — Z3A.37 37 WEEKS GESTATION OF PREGNANCY: ICD-10-CM

## 2022-04-05 DIAGNOSIS — G43.909 MIGRAINE, UNSPECIFIED, NOT INTRACTABLE, WITHOUT STATUS MIGRAINOSUS: ICD-10-CM

## 2022-04-05 DIAGNOSIS — O36.5930 MATERNAL CARE FOR OTHER KNOWN OR SUSPECTED POOR FETAL GROWTH, THIRD TRIMESTER, NOT APPLICABLE OR UNSPECIFIED: ICD-10-CM

## 2022-04-06 ENCOUNTER — NON-APPOINTMENT (OUTPATIENT)
Age: 37
End: 2022-04-06

## 2022-04-07 ENCOUNTER — TRANSCRIPTION ENCOUNTER (OUTPATIENT)
Age: 37
End: 2022-04-07

## 2022-04-08 ENCOUNTER — TRANSCRIPTION ENCOUNTER (OUTPATIENT)
Age: 37
End: 2022-04-08

## 2022-04-11 ENCOUNTER — TRANSCRIPTION ENCOUNTER (OUTPATIENT)
Age: 37
End: 2022-04-11

## 2022-04-16 ENCOUNTER — NON-APPOINTMENT (OUTPATIENT)
Age: 37
End: 2022-04-16

## 2022-05-05 ENCOUNTER — TRANSCRIPTION ENCOUNTER (OUTPATIENT)
Age: 37
End: 2022-05-05

## 2022-05-06 ENCOUNTER — TRANSCRIPTION ENCOUNTER (OUTPATIENT)
Age: 37
End: 2022-05-06

## 2022-05-07 ENCOUNTER — TRANSCRIPTION ENCOUNTER (OUTPATIENT)
Age: 37
End: 2022-05-07

## 2022-05-09 ENCOUNTER — TRANSCRIPTION ENCOUNTER (OUTPATIENT)
Age: 37
End: 2022-05-09

## 2022-05-09 ENCOUNTER — NON-APPOINTMENT (OUTPATIENT)
Age: 37
End: 2022-05-09

## 2022-05-10 ENCOUNTER — TRANSCRIPTION ENCOUNTER (OUTPATIENT)
Age: 37
End: 2022-05-10

## 2022-05-12 ENCOUNTER — TRANSCRIPTION ENCOUNTER (OUTPATIENT)
Age: 37
End: 2022-05-12

## 2022-05-13 ENCOUNTER — APPOINTMENT (OUTPATIENT)
Dept: OBGYN | Facility: CLINIC | Age: 37
End: 2022-05-13

## 2022-05-13 VITALS
WEIGHT: 143 LBS | BODY MASS INDEX: 20.47 KG/M2 | DIASTOLIC BLOOD PRESSURE: 60 MMHG | HEIGHT: 70 IN | SYSTOLIC BLOOD PRESSURE: 100 MMHG

## 2022-05-14 NOTE — HISTORY OF PRESENT ILLNESS
[Last Pap Date: ___] : Last Pap Date: [unfilled] [Delivery Date: ___] : on [unfilled] [Female] : Delivery History: baby girl [Postpartum Follow Up] : postpartum follow up [IUGR] : IUGR [Normal Spontaneous Vaginal Delivery] : normal spontaneous vaginal delivery [Girl] : baby is a girl [Infant's Name ___] : [unfilled] [___ Grams] : [unfilled] grams [___ at One Minute] : [unfilled] at one minute after birth [___ at Five Minutes] : [unfilled] at five minutes after birth [Living at Home] : is currently living at home [Breastfeeding] : breastfeeding [Bottle Feeding] : bottle feeding [Fatigue] : fatigue [Back to Normal] : is back to normal in size [None] : no vaginal bleeding [Examination Of The Breasts] : breasts are normal [Awake] : awake [Alert] : alert [Soft] : soft [Labia Majora] : labia major [Labia Minora] : labia minora [Normal] : clitoris [Complications:___] : no complications [BF with Difficulty] : nursing without difficulty [Abdominal Pain] : no abdominal pain [Back Pain] : no back pain [Breast Pain] : no breast pain [Chest Pain] : no chest pain [Cracked Nipples] : no cracked nipples [S/Sx PP Depression] : no signs/symptoms of postpartum depression [Heavy Bleeding] : no heavy bleeding [Incisional Drainage] : no incisional drainage [Incisional Pain] : no incisional pain [Irregular Bleeding] : no irregular bleeding [Leg Pain] : no leg pain [Shortness of Breath] : no shortness of breath [Suicidal Ideation] : no suicidal ideation [Vaginal Discharge] : no vaginal discharge [Chills] : no chills [Dysuria] : no dysuria [Cervix Sample Taken] : cervical sample not taken for a Pap smear [Acute Distress] : no acute distress [Thyroid Nodule] : no thyroid nodule [Goiter] : no goiter [Mass] : no breast mass [Nipple Discharge] : no nipple discharge [Tender] : non tender [Distended] : not distended [H/Smegaly] : no hepatosplenomegaly [Oriented x3] : disoriented to person, place, and time [Depressed Mood] : not depressed [[id number]] : 6 week post partum [de-identified] : B [de-identified] : Initially in NICU for low birth weight then transferred to  nursery [de-identified] : Hali is also trying to put infant to breast using nipple shield.  She is mostly pumping her breasts to feed Gilma.  [de-identified] : Second degree laceration healed well no visible suture left.  No tenderness.  Last PAP December 2021 was WNL per patient.

## 2022-05-25 ENCOUNTER — TRANSCRIPTION ENCOUNTER (OUTPATIENT)
Age: 37
End: 2022-05-25

## 2022-05-27 ENCOUNTER — TRANSCRIPTION ENCOUNTER (OUTPATIENT)
Age: 37
End: 2022-05-27

## 2022-10-07 ENCOUNTER — TRANSCRIPTION ENCOUNTER (OUTPATIENT)
Age: 37
End: 2022-10-07

## 2022-10-13 ENCOUNTER — TRANSCRIPTION ENCOUNTER (OUTPATIENT)
Age: 37
End: 2022-10-13

## 2022-10-17 ENCOUNTER — TRANSCRIPTION ENCOUNTER (OUTPATIENT)
Age: 37
End: 2022-10-17

## 2022-10-27 ENCOUNTER — TRANSCRIPTION ENCOUNTER (OUTPATIENT)
Age: 37
End: 2022-10-27

## 2022-10-28 ENCOUNTER — TRANSCRIPTION ENCOUNTER (OUTPATIENT)
Age: 37
End: 2022-10-28

## 2022-10-31 ENCOUNTER — TRANSCRIPTION ENCOUNTER (OUTPATIENT)
Age: 37
End: 2022-10-31